# Patient Record
Sex: MALE | URBAN - METROPOLITAN AREA
[De-identification: names, ages, dates, MRNs, and addresses within clinical notes are randomized per-mention and may not be internally consistent; named-entity substitution may affect disease eponyms.]

---

## 2021-06-04 ENCOUNTER — HOSPITAL ENCOUNTER (OUTPATIENT)
Dept: MEDSURG UNIT | Facility: HOSPITAL | Age: 61
End: 2021-06-05
Attending: INTERNAL MEDICINE | Admitting: INTERNAL MEDICINE

## 2021-06-04 LAB
ABS NEUT (OLG): 2.94 X10(3)/MCL (ref 2.1–9.2)
ALBUMIN SERPL-MCNC: 4.3 GM/DL (ref 3.5–5)
ALBUMIN/GLOB SERPL: 1.4 RATIO (ref 1.1–2)
ALP SERPL-CCNC: 71 UNIT/L (ref 40–150)
ALT SERPL-CCNC: 31 UNIT/L (ref 0–55)
AST SERPL-CCNC: 19 UNIT/L (ref 5–34)
BASOPHILS # BLD AUTO: 0.1 X10(3)/MCL (ref 0–0.2)
BASOPHILS NFR BLD AUTO: 1 %
BILIRUB SERPL-MCNC: 0.6 MG/DL
BILIRUBIN DIRECT+TOT PNL SERPL-MCNC: 0.2 MG/DL (ref 0–0.5)
BILIRUBIN DIRECT+TOT PNL SERPL-MCNC: 0.4 MG/DL (ref 0–0.8)
BNP BLD-MCNC: <10 PG/ML
BUN SERPL-MCNC: 21.6 MG/DL (ref 8.4–25.7)
CALCIUM SERPL-MCNC: 9.8 MG/DL (ref 8.4–10.2)
CHLORIDE SERPL-SCNC: 104 MMOL/L (ref 98–107)
CO2 SERPL-SCNC: 26 MMOL/L (ref 22–29)
CREAT SERPL-MCNC: 1.02 MG/DL (ref 0.73–1.18)
D DIMER PPP IA.FEU-MCNC: <0.27 MCG/ML FEU (ref 0–0.5)
EOSINOPHIL # BLD AUTO: 0.2 X10(3)/MCL (ref 0–0.9)
EOSINOPHIL NFR BLD AUTO: 4 %
ERYTHROCYTE [DISTWIDTH] IN BLOOD BY AUTOMATED COUNT: 12.9 % (ref 11.5–17)
GLOBULIN SER-MCNC: 3 GM/DL (ref 2.4–3.5)
GLUCOSE SERPL-MCNC: 114 MG/DL (ref 74–100)
HCT VFR BLD AUTO: 41.2 % (ref 42–52)
HGB BLD-MCNC: 14 GM/DL (ref 14–18)
LYMPHOCYTES # BLD AUTO: 2.1 X10(3)/MCL (ref 0.6–4.6)
LYMPHOCYTES NFR BLD AUTO: 35 %
MCH RBC QN AUTO: 31.1 PG (ref 27–31)
MCHC RBC AUTO-ENTMCNC: 34 GM/DL (ref 33–36)
MCV RBC AUTO: 91.6 FL (ref 80–94)
MONOCYTES # BLD AUTO: 0.7 X10(3)/MCL (ref 0.1–1.3)
MONOCYTES NFR BLD AUTO: 12 %
NEUTROPHILS # BLD AUTO: 2.94 X10(3)/MCL (ref 2.1–9.2)
NEUTROPHILS NFR BLD AUTO: 48 %
PLATELET # BLD AUTO: 125 X10(3)/MCL (ref 130–400)
PMV BLD AUTO: 10.3 FL (ref 9.4–12.4)
POC TROPONIN: 0 NG/ML (ref 0–0.08)
POTASSIUM SERPL-SCNC: 5.1 MMOL/L (ref 3.5–5.1)
PROT SERPL-MCNC: 7.3 GM/DL (ref 6.4–8.3)
RBC # BLD AUTO: 4.5 X10(6)/MCL (ref 4.7–6.1)
SARS-COV-2 AG RESP QL IA.RAPID: NEGATIVE
SODIUM SERPL-SCNC: 143 MMOL/L (ref 136–145)
TROPONIN I SERPL-MCNC: <0.01 NG/ML (ref 0–0.04)
WBC # SPEC AUTO: 6.1 X10(3)/MCL (ref 4.5–11.5)

## 2021-06-05 LAB
TROPONIN I SERPL-MCNC: <0.01 NG/ML (ref 0–0.04)
TROPONIN I SERPL-MCNC: <0.01 NG/ML (ref 0–0.04)

## 2022-04-30 NOTE — ED PROVIDER NOTES
Patient:   Gen Chavez            MRN: [id number]            FIN: [id number]               Age:   61 years     Sex:  Male     :  1960   Associated Diagnoses:   Chest pain; Dyspnea   Author:   Bethel Prado      Basic Information   Time seen: Date & time 2021 18:32:00.   History source: Patient.   Arrival mode: Private vehicle.   Additional information: Patient's physician(s): 1069: Assumed care KAUSHIK CASILLAS.      History of Present Illness   The patient presents with   61 year old male presents to ED for worsening shortness of breath since yesterday with mild chest pain; denies fever - HEATHER Allen PA-C sort .  The onset was 1  days ago.  The course/duration of symptoms is fluctuating in intensity.  Degree at onset moderate.  Degree at present moderate.  The Exacerbating factors is none.  The Relieving factors is none.  Risk factors consist of diabetes mellitus and hypertension.  Prior episodes: none.  Therapy today: none.  Associated symptoms: chest pain.        Review of Systems   Constitutional symptoms:  No fever, no chills.    Respiratory symptoms:  Shortness of breath, No cough,    Cardiovascular symptoms:  Chest pain, No palpitations,    Gastrointestinal symptoms:  No vomiting, no diarrhea.    Musculoskeletal symptoms:  No back pain,    Neurologic symptoms:  No altered level of consciousness, no weakness.              Additional review of systems information: All other systems reviewed and otherwise negative.      Health Status   Allergies:    Allergic Reactions (Selected)  No Known Medication Allergies,    Allergies (1) Active Reaction  No Known Medication Allergies None Documented  .   Medications:  (Selected)   Prescriptions  Prescribed  diclofenac potassium 50 mg oral tablet: 50 mg = 1 tab(s), Oral, TID, PRN PRN as needed for pain, # 30 tab(s), 0 Refill(s), Pharmacy: [facility] #[id number], 165, cm, Height/Length Dosing, 20 15:59:00 CDT, 95, kg, Weight  Dosing, 06/07/20 15:59:00 CDT  tiZANidine 4 mg oral tablet: 4 mg = 1 tab(s), Oral, q8hr, # 21 tab(s), 0 Refill(s), [id number], 165, cm, Height/Length Dosing, 06/07/20 15:59:00 CDT, 95, kg, Weight Dosing, 06/07/20 15:59:00 CDT, per nurse's notes.   Immunizations: Per nurse's notes.      Past Medical/ Family/ Social History   Medical history:    Resolved  Diabetes mellitus ([id number]):  Resolved..   Surgical history:    No active procedure history items have been selected or recorded..   Family history:    No family history items have been selected or recorded..   Social history:    Social & Psychosocial Habits    Tobacco  06/07/2020  Use: Former smoker, quit more    Patient Wants Consult For Cessation Counseling No    Abuse/Neglect  06/07/2020  SHX Any signs of abuse or neglect No  .   Problem list:    No qualifying data available  .      Physical Examination               Vital Signs      No qualifying data available.      Medical Decision Making   Documents reviewed:  Emergency department nurses' notes.   Orders  Launch Orders   Laboratory:  BNP (Order): Stat collect, 6/4/2021 18:32 CDT, Blood, Lab Collect, Print Label By Order Location, 6/4/2021 18:32 CDT  CBC w/ Auto Diff (Order): Now collect, 6/4/2021 18:32 CDT, Blood, Lab Collect, Print Label By Order Location, 6/4/2021 18:32 CDT  CMP (Order): Stat collect, 6/4/2021 18:32 CDT, Blood, Lab Collect, Print Label By Order Location, 6/4/2021 18:32 CDT  Troponin-I (Order): Stat collect, 6/4/2021 18:32 CDT, Blood, Lab Collect, Print Label By Order Location, 6/4/2021 18:32 CDT  POC iSTAT ER Troponin request (Order): Blood, Stat collect, 6/4/2021 18:32 CDT by Mahesh Allen PA-C Lab Collect, Print Label By Order Location  Radiology:  XR Chest 2 Views (Order): Stat, 6/4/2021 18:32 CDT, Dyspnea, None, Stretcher, Rad Type, Not Scheduled  Cardiology:  EKG (Order): 6/4/2021 18:32 CDT, Stat, Once, 6/4/2021 18:32 CDT, -1, -1, 6/4/2021 18:32 CDT,  Launch Orders   Pharmacy:  nitroglycerin 0.4 mg sublingual TAB (Order): 0.4 mg, form: Tab-SL, SL, Once, first dose 6/4/2021 22:06 CDT, stop date 6/4/2021 22:06 CDT, STAT  aspirin 325 mg oral tablet (Order): 325 mg, form: Tab, Oral, Daily, first dose 6/4/2021 22:06 CDT, STAT, 4 chew tab = 5 grains, Launch Orders   Laboratory:  D-Dimer (Order): Stat collect, 6/4/2021 22:09 CDT, Blood, Lab Collect, Print Label By Order Location, 6/4/2021 22:09 CDT, Launch Orders   Laboratory:  COVID-19 IDnow (Order): Now collect, Nasal, 6/4/2021 23:49 CDT, Nurse collect.    Electrocardiogram:  Time 6/4/2021 18:34:00, rate 87, normal sinus rhythm, No ST-T changes, no ectopy, normal NJ & QRS intervals, EP Interp.    Results review:  Lab results : Lab View   6/4/2021 19:25 CDT       POC Troponin              0.00 ng/mL    6/4/2021 19:02 CDT       Sodium Lvl                143 mmol/L                             Potassium Lvl             5.1 mmol/L                             Chloride                  104 mmol/L                             CO2                       26 mmol/L                             Calcium Lvl               9.8 mg/dL                             Glucose Lvl               114 mg/dL  HI                             BUN                       21.6 mg/dL                             Creatinine                1.02 mg/dL                             eGFR-AA                   >60  NA                             eGFR-NAZARIO                  >60 mL/min/1.73 m2  NA                             Bili Total                0.6 mg/dL                             Bili Direct               0.2 mg/dL                             Bili Indirect             0.40 mg/dL                             AST                       19 unit/L                             ALT                       31 unit/L                             Alk Phos                  71 unit/L                             Total Protein             7.3 gm/dL                              Albumin Lvl               4.3 gm/dL                             Globulin                  3.0 gm/dL                             A/G Ratio                 1.4 ratio                             BNP                       <10.0 pg/mL                             Troponin-I                <0.010 ng/mL                             WBC                       6.1 x10(3)/mcL                             RBC                       4.50 x10(6)/mcL  LOW                             Hgb                       14.0 gm/dL                             Hct                       41.2 %  LOW                             Platelet                  125 x10(3)/mcL  LOW                             MCV                       91.6 fL                             MCH                       31.1 pg  HI                             MCHC                      34.0 gm/dL                             RDW                       12.9 %                             MPV                       10.3 fL                             Abs Neut                  2.94 x10(3)/mcL                             Neutro Auto               48 %  NA                             Lymph Auto                35 %  NA                             Mono Auto                 12 %  NA                             Eos Auto                  4 %  NA                             Abs Eos                   0.2 x10(3)/mcL                             Basophil Auto             1 %  NA                             Abs Neutro                2.94 x10(3)/mcL                             Abs Lymph                 2.1 x10(3)/mcL                             Abs Mono                  0.7 x10(3)/mcL                             Abs Baso                  0.1 x10(3)/mcL  ,    No qualifying data available.    Radiology results:  Rad Results (ST)  < 12 hrs   Accession: [id number]  Order: XR Chest 2 Views  Report Dt/Tm: 06/04/2021 18:52  Report:   CHEST 2 VIEW RADIOGRAPHIC SERIES     INDICATION: Dyspnea    FINDINGS:  Lungs  are well aerated. No sizable pleural effusion. No shift of the  mediastinum.     IMPRESSION:  No active cardiopulmonary finding    .      Impression and Plan   Diagnosis   Chest pain (IGJ81-AJ R07.9)   Dyspnea (YFV79-JB R06.00)      Calls-Consults   -  6/4/2021 23:55:00 , Robe Dupree MD, recommends consuelo accepts.    Plan   Condition: Stable.    Disposition: Admit time  6/5/2021 00:51:00, Place in Observation Unit.    Counseled: Patient, Regarding diagnosis, Regarding diagnostic results, Regarding treatment plan, Patient indicated understanding of instructions.    Orders: Launch Orders   Admit/Transfer/Discharge:  Place in Outpatient Observation (Order): 6/5/2021 0:52 CDT, Telemetry with Monitor Robe Dupree MD, No.    Notes: Admitted in collaboration with Dr. MCKENZIE Hector.

## 2022-04-30 NOTE — ED PROVIDER NOTES
Patient:   Gen Chavez            MRN: [id number]            FIN: [id number]               Age:   61 years     Sex:  Male     :  1960   Associated Diagnoses:   Acute chest pain   Author:   Crow Hector MD      History of Present Illness   The patient presents with I examined this patient in conjunction with nurse practitioner, I agree with his assessment and plan.  Patient is a 61-year-old male presenting with complaint of episodes of left sided chest pain over the past couple of days.  Patient states episodes of chest pain usually start while he is working.  He denies any chest pain currently.  Patient states that the chest pain did last a minute or 2 at a time.  Patient denies nausea vomiting.  No abdominal pain.  Patient is not a smoker but does have a history of diabetes. I examined this patient in conjunction with nurse practitioner, I agree with his assessment and plan.  Patient is a 61-year-old male presenting with complaint of episodes of left sided chest pain over the past couple of days.  Patient states episodes of chest pain usually start while he is working.  He denies any chest pain currently.  Patient states that the chest pain did last a minute or 2 at a time.  Patient denies nausea vomiting cough or shortness of breath.  No abdominal pain.  Patient is not a smoker but does have a history of diabetes. .  Risk factors consist of hypertension, diabetes mellitus, obesity and age.  Associated symptoms: chest pain, shortness of breath denies shortness of breath , denies abdominal pain, denies nausea, denies vomiting, denies fever, denies chills, denies headache, denies dizziness and denies back pain.        Review of Systems   Constitutional symptoms:  No fever, no chills, no sweats, no weakness, no fatigue, no decreased activity.    Skin symptoms:  No jaundice, no rash, no pruritus, no abrasions, no breakdown, no burns, no dryness, no petechiae, no lesion.    Eye symptoms:  Vision  unchanged, no pain, no blurred vision.    ENMT symptoms:  No ear pain, no sore throat, no nasal congestion, no sinus pain.    Respiratory symptoms:  Shortness of breath No shortness of breath , no orthopnea, no cough, no hemoptysis, no stridor, no wheezing.    Cardiovascular symptoms:  Chest pain, no palpitations, no tachycardia, no syncope, no diaphoresis, no peripheral edema.    Gastrointestinal symptoms:  No abdominal pain, no nausea, no vomiting, no diarrhea, no constipation, no rectal bleeding, no rectal pain.    Genitourinary symptoms:  No dysuria, no hematuria.    Musculoskeletal symptoms:  No back pain, no Muscle pain, no Joint pain.    Neurologic symptoms:  No headache, no dizziness, no altered level of consciousness, no numbness, no tingling, no weakness.              Additional review of systems information: All systems reviewed as documented in chart.      Physical Examination               Vital Signs   Vital Signs   6/5/2021 0:00 CDT        Peripheral Pulse Rate     74 bpm                             Respiratory Rate          18 br/min                             SpO2                      99 %                             Oxygen Therapy            Room air                             Systolic Blood Pressure   129 mmHg                             Diastolic Blood Pressure  79 mmHg                             Mean Arterial Pressure, Cuff              96 mmHg    6/4/2021 23:00 CDT       Peripheral Pulse Rate     79 bpm                             Respiratory Rate          16 br/min                             SpO2                      98 %                             Oxygen Therapy            Room air                             Systolic Blood Pressure   131 mmHg                             Diastolic Blood Pressure  79 mmHg                             Mean Arterial Pressure, Cuff              96 mmHg    6/4/2021 22:00 CDT       Peripheral Pulse Rate     86 bpm                             Heart Rate  Monitored      67 bpm                             Respiratory Rate          16 br/min                             SpO2                      98 %                             Systolic Blood Pressure   117 mmHg                             Diastolic Blood Pressure  76 mmHg                             Mean Arterial Pressure, Cuff              90 mmHg    6/4/2021 21:00 CDT       Peripheral Pulse Rate     79 bpm                             Heart Rate Monitored      78 bpm                             Respiratory Rate          16 br/min                             SpO2                      97 %                             Oxygen Therapy            Room air                             Systolic Blood Pressure   119 mmHg                             Diastolic Blood Pressure  75 mmHg                             Mean Arterial Pressure, Cuff              90 mmHg    6/4/2021 20:34 CDT       Peripheral Pulse Rate     84 bpm                             Heart Rate Monitored      81 bpm                             SpO2                      97 %                             Systolic Blood Pressure   126 mmHg                             Diastolic Blood Pressure  87 mmHg                             Mean Arterial Pressure, Cuff              100 mmHg    6/4/2021 18:31 CDT       Temperature Oral          36.7 DegC                             Temperature Oral (calculated)             98.06 DegF                             Peripheral Pulse Rate     91 bpm                             Respiratory Rate          18 br/min                             SpO2                      99 %                             Oxygen Therapy            Room air                             Systolic Blood Pressure   121 mmHg                             Diastolic Blood Pressure  67 mmHg  .   Oxygen saturation.   General:  Alert, no acute distress.    Skin:  Warm, dry, no rash.    Head:  Normocephalic, atraumatic.    Neck:  Supple, trachea midline, no tenderness.    Eye    Ears, nose, mouth and throat:  Oral mucosa moist.   Respiratory:  Lungs are clear to auscultation, respirations are non-labored, breath sounds are equal, Symmetrical chest wall expansion.    Cardiovascular:  Regular rate and rhythm, No murmur, Normal peripheral perfusion.    Gastrointestinal:  Soft, Nontender, Non distended, Normal bowel sounds, No organomegaly.    Musculoskeletal:  Normal ROM, normal strength, no swelling, no deformity.    Neurological:  Alert and oriented to person, place, time, and situation, No focal neurological deficit observed, normal sensory observed, normal motor observed, normal speech observed.    Psychiatric:  Cooperative, appropriate mood & affect.       Medical Decision Making   Results review:  Lab results : Lab View   6/4/2021 22:09 CDT       D-Dimer                   <0.27 mcg/mL FEU    6/4/2021 19:25 CDT       POC Troponin              0.00 ng/mL    6/4/2021 19:02 CDT       Sodium Lvl                143 mmol/L                             Potassium Lvl             5.1 mmol/L                             Chloride                  104 mmol/L                             CO2                       26 mmol/L                             Calcium Lvl               9.8 mg/dL                             Glucose Lvl               114 mg/dL  HI                             BUN                       21.6 mg/dL                             Creatinine                1.02 mg/dL                             eGFR-AA                   >60  NA                             eGFR-NAZARIO                  >60 mL/min/1.73 m2  NA                             Bili Total                0.6 mg/dL                             Bili Direct               0.2 mg/dL                             Bili Indirect             0.40 mg/dL                             AST                       19 unit/L                             ALT                       31 unit/L                             Alk Phos                  71 unit/L                              Total Protein             7.3 gm/dL                             Albumin Lvl               4.3 gm/dL                             Globulin                  3.0 gm/dL                             A/G Ratio                 1.4 ratio                             BNP                       <10.0 pg/mL                             Troponin-I                <0.010 ng/mL                             WBC                       6.1 x10(3)/mcL                             RBC                       4.50 x10(6)/mcL  LOW                             Hgb                       14.0 gm/dL                             Hct                       41.2 %  LOW                             Platelet                  125 x10(3)/mcL  LOW                             MCV                       91.6 fL                             MCH                       31.1 pg  HI                             MCHC                      34.0 gm/dL                             RDW                       12.9 %                             MPV                       10.3 fL                             Abs Neut                  2.94 x10(3)/mcL                             Neutro Auto               48 %  NA                             Lymph Auto                35 %  NA                             Mono Auto                 12 %  NA                             Eos Auto                  4 %  NA                             Abs Eos                   0.2 x10(3)/mcL                             Basophil Auto             1 %  NA                             Abs Neutro                2.94 x10(3)/mcL                             Abs Lymph                 2.1 x10(3)/mcL                             Abs Mono                  0.7 x10(3)/mcL                             Abs Baso                  0.1 x10(3)/mcL    6/4/2021 0:10 CDT        COVID-19 Rapid            NEGATIVE  .   Radiology results:  Rad Results (ST)  < 12 hrs   Accession: [id number]  Order: XR Chest 2 Views  Report  Dt/Tm: 06/04/2021 18:52  Report:   CHEST 2 VIEW RADIOGRAPHIC SERIES     INDICATION: Dyspnea    FINDINGS:  Lungs are well aerated. No sizable pleural effusion. No shift of the  mediastinum.     IMPRESSION:  No active cardiopulmonary finding    .      Reexamination/ Reevaluation   Time: 6/5/2021 01:04:00 .   Vital signs   results included from flowsheet : Vital Signs   6/5/2021 0:00 CDT        Peripheral Pulse Rate     74 bpm                             Respiratory Rate          18 br/min                             SpO2                      99 %                             Oxygen Therapy            Room air                             Systolic Blood Pressure   129 mmHg                             Diastolic Blood Pressure  79 mmHg                             Mean Arterial Pressure, Cuff              96 mmHg        Impression and Plan   Diagnosis   Acute chest pain ([id number] R07.9)   Plan   Condition: Stable.    Disposition: Admit time  6/5/2021 01:04:00.    Counseled: Patient, Regarding diagnosis, Regarding diagnostic results, Regarding treatment plan, Patient indicated understanding of instructions.

## 2022-05-03 NOTE — HISTORICAL [facility]
This is a historical note converted from [place]. Formatting and pictures may have been removed.  Please reference [place] for original formatting and attached multimedia. Chief Complaint  SOB, chest pain  History of Present Illness  Gen Chavez is a 61-year-old gentleman with a history of?diabetes mellitus,?HLD,?CAD, JOSE not on CPAP and hypertension?who presented to the ED with complaints of a sudden onset, intermittent?left-sided?and lower substernal, nonradiating?chest pain described as a?mild?pressure?and a warm?and wet?sensation?over the area with associated?shortness of breath which he attributes?to hyperventilating?that began x2 days ago while he was sitting down eating at a restaurant. ?He denies any specific alleviating or exacerbating factors.? He apparently?had a?LHC?approximately x20 years ago with cardiology,?and had partial?blockage?but did not require?any stents.? He stated he had a repeat LHC?a year later?after he was started?on a statin?and the cardiologist stated?the blockage improved.? He denies current chest pain,?palpitations, shortness of breath, fever, cough, abdominal pain, N/V/D, or?dysuria. His vital signs are unremarkable. ?Lab results showed a glucose 114 otherwise unremarkable with a negative troponin x3. ?COVID-19 negative. CXR showed no active cardiopulmonary finding. He was given? mg and nitroglycerin while in the ED.  Review of Systems  As per HPI otherwise all systems reviewed and negative.  Physical Exam  Vitals & Measurements  T:?36.7? ?C (Oral)? HR:?84(Peripheral)? RR:?18? BP:?137/80? SpO2:?96%? WT:?100?kg? BMI:?36.73?  General:?well-developed well-nourished in no acute distress  Eye: PERRLA, EOMI, clear conjunctiva, eyelids normal  HENT:? oropharynx without erythema/exudate, oropharynx and nasal mucosal surfaces moist  Neck: full range of motion, no? lymphadenopathy  Respiratory:?clear to auscultation bilaterally  Cardiovascular:?regular rate and rhythm without murmurs,  gallops or rubs, nonreproducible chest wall tenderness  Gastrointestinal:?soft, non-tender, non-distended with normal bowel sounds, without masses to palpation, obese  Genitourinary: no CVA tenderness to palpation  Musculoskeletal:?full range of motion of all extremities/spine without limitation or discomfort  Integumentary: warm, Dry  Neurologic: cranial nerves intact, no signs of peripheral neurological deficit, motor/sensory function intact  ?  Assessment/Plan  Atypical chest pain?possibly indigestion, resolved  Shortness of breath  Mild hyperglycemia secondary to diabetes mellitus  Hypertension  Hyperlipidemia  CAD  ?  Plan:  -His chest pain?and shortness of breath?are currently resolved, chest x-ray is negative, EKG was unremarkable,?and troponin?x3?negative so he most likely can go home?soon  -Continue with daily aspirin, control pain as needed  -D-dimer was negative  -Monitor CBG?and insulin sliding scale?before meals and at bedtime  -Repeat CBC and BMP in a.m.  ?  DVT prophylaxis: SCDs  Code Status: Full  PCP: Dr. Coffman  ?  I, Dylan Siddiqui PA-C, discussed this case with Dr. Green.  Please see addendum for further assessment and plan per MD.  ?  ?   I Dr. Green Assumed care of patient today at 11:00 a.m.  ?   For this patient encounter, I reviewed the NP/PA documentation, treatment plan and medical decision making, and I had face to face time with this patient.?  ?   Seen and examined the patient.  He is a 61 years old male with a history of?hypertension diabetes hyperlipidemia.  He is coming with atypical chest pain. 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 ?He states that chest pain?is not related with any type of activity it is only 10 seconds and goes away?he?denies any radiation of the pain. ?He sometimes?feel?the same?pain?after the meals.??He had a cardiac cath 20 years ago which was unremarkable. ?He also had a?recent checkup, including  stress test?in December 2020?which was?also normal. ?He denies any pain or shortness of breath?with?activity?altogether.  He is afebrile vitals stable hemodynamically stable.? Labs were completely unremarkable EKG is normal sinus rhythm?troponins x3.  ?   Agree with above exam.  ?   I discussed extensively with the patient in regards of his symptoms and the nature of the symptoms. ?All the tests are negative in the hospital?including troponins EKG and chest x-ray.??Giving the recent outdoor activities and physical work?he has done with the upper?body, his pain could be?musculoskeletal. ?He is also describing?some discomfort after?a meal.  I will discharge patient to home with a close follow-up with cardiology. ?He could get?outpatient stress test.discussed with these options with him?extensively and he agreed.  I will give him a follow-up with cardiologist, and primary care physician?and discharged in a stable condition to home.   Problem List/Past Medical History  DM type II  Hypertension  Hyperlipidemia  JOSE  CAD  Procedure/Surgical History  Carpal tunnel  Left arthroscopic knee surgery  Deviated septum surgery  Medications  Inpatient  aspirin 325 mg oral tablet, 325 mg= 1 tab(s), Oral, Daily  Dextrose 50% and Water (50 mL vial/syringe), 12.5 gm= 25 mL, IV Push, Once, PRN  Dextrose 50% and Water (50 mL vial/syringe), 12.5 gm= 25 mL, IV Push, As Directed, PRN  Dextrose 50% and Water (50 mL vial/syringe), 25 gm= 50 mL, IV Push, As Directed, PRN  Dextrose 50% in Water intravenous solution, 12.5 gm= 25 mL, IV Push, As Directed, PRN  glucagon, 1 mg= 1 EA, IM, q10min, PRN  glucagon, 1 mg= 1 EA, IM, q10min, PRN  insulin lispro, 2-14 units, Subcutaneous, As Directed, PRN  Home  atorvastatin 40 mg oral tablet, 40 mg= 1 tab(s), Oral, qPM  diclofenac potassium 50 mg oral tablet, 50 mg= 1 tab(s), Oral, TID, PRN,? ?Not Taking, Completed Rx  glimepiride 4 mg oral tablet, 4 mg= 1 tab(s), Oral, BID  lisinopril 20 mg oral tablet,  20 mg= 1 tab(s), Oral, Daily  metFORMIN 1000 mg oral tablet, 1000 mg= 1 tab(s), Oral, BID  Ozempic (1 mg dose) 2 mg/1.5 mL subcutaneous solution, 1 mg= 1 mg, Subcutaneous, qWeek  pioglitazone 30 mg oral tablet, 30 mg= 1 tab(s), Oral, Daily,? ?Not taking  tiZANidine 4 mg oral tablet, 4 mg= 1 tab(s), Oral, q8hr,? ?Not Taking, Completed Rx  Vascepa 1 g oral capsule, 2 gm= 2 cap(s), Oral, BID  Allergies  No Known Medication Allergies  Social History  Abuse/Neglect  No, 06/05/2021  Tobacco  Former smoker, quit more than 30 days ago, No, 06/05/2021  Alcohol- occasional  Illicit Drugs- none  Family History  Mother: Afib  Father: Afib, stroke  Lab Results  Labs Last 24 Hours?  ?Chemistry? Hematology/Coagulation?   Sodium Lvl: 143 mmol/L (06/04/21 19:02:00) D-Dimer: <0.27 (06/04/21 22:09:00)   Potassium Lvl: 5.1 mmol/L (06/04/21 19:02:00) WBC: 6.1 x10(3)/mcL (06/04/21 19:02:00)   Chloride: 104 mmol/L (06/04/21 19:02:00) RBC:?4.5 x10(6)/mcL?Low (06/04/21 19:02:00)   CO2: 26 mmol/L (06/04/21 19:02:00) Hgb: 14 gm/dL (06/04/21 19:02:00)   Calcium Lvl: 9.8 mg/dL (06/04/21 19:02:00) Hct:?41.2 %?Low (06/04/21 19:02:00)   Glucose Lvl:?114 mg/dL?High (06/04/21 19:02:00) Platelet:?125 x10(3)/mcL?Low (06/04/21 19:02:00)   BUN: 21.6 mg/dL (06/04/21 19:02:00) MCV: 91.6 fL (06/04/21 19:02:00)   Creatinine: 1.02 mg/dL (06/04/21 19:02:00) MCH:?31.1 pg?High (06/04/21 19:02:00)   Est Creat Clearance Ser: 67.73 mL/min (06/05/21 01:36:46) MCHC: 34 gm/dL (06/04/21 19:02:00)   eGFR-AA: >60 (06/04/21 19:02:00) RDW: 12.9 % (06/04/21 19:02:00)   eGFR-NAZARIO: >60 (06/04/21 19:02:00) MPV: 10.3 fL (06/04/21 19:02:00)   Bili Total: 0.6 mg/dL (06/04/21 19:02:00) Abs Neut: 2.94 x10(3)/mcL (06/04/21 19:02:00)   Bili Direct: 0.2 mg/dL (06/04/21 19:02:00) Neutro Auto: 48 % (06/04/21 19:02:00)   Bili Indirect: 0.4 mg/dL (06/04/21 19:02:00) Lymph Auto: 35 % (06/04/21 19:02:00)   AST: 19 unit/L (06/04/21 19:02:00) Mono Auto: 12 % (06/04/21 19:02:00)   ALT: 31  unit/L (06/04/21 19:02:00) Eos Auto: 4 % (06/04/21 19:02:00)   Alk Phos: 71 unit/L (06/04/21 19:02:00) Abs Eos: 0.2 x10(3)/mcL (06/04/21 19:02:00)   Total Protein: 7.3 gm/dL (06/04/21 19:02:00) Basophil Auto: 1 % (06/04/21 19:02:00)   Albumin Lvl: 4.3 gm/dL (06/04/21 19:02:00) Abs Neutro: 2.94 x10(3)/mcL (06/04/21 19:02:00)   Globulin: 3 gm/dL (06/04/21 19:02:00) Abs Lymph: 2.1 x10(3)/mcL (06/04/21 19:02:00)   A/G Ratio: 1.4 ratio (06/04/21 19:02:00) Abs Mono: 0.7 x10(3)/mcL (06/04/21 19:02:00)   BNP: <10.0 (06/04/21 19:02:00) Abs Baso: 0.1 x10(3)/mcL (06/04/21 19:02:00)   Troponin-I: <0.010 (06/05/21 08:23:00)    POC Troponin: 0 ng/mL (06/04/21 19:25:00)    Diagnostic Results  ?  Reason For Exam  Dyspnea  ?  Radiology Report  CHEST 2 VIEW RADIOGRAPHIC SERIES  ?  INDICATION: Dyspnea ?  FINDINGS:  Lungs are well aerated. No sizable pleural effusion. No shift of the  mediastinum.  ?  IMPRESSION:  No active cardiopulmonary finding  ?  Signature Line  Electronically Signed By: Diane Knight MD  Date/Time Signed: 06/04/2021 18:53  ?      He was advised to come back to the hospital?if?his chest pain persists?and worsens, or he has?shortness of breath.

## 2023-06-05 ENCOUNTER — LAB REQUISITION (OUTPATIENT)
Dept: LAB | Facility: HOSPITAL | Age: 63
End: 2023-06-05
Payer: COMMERCIAL

## 2023-06-05 DIAGNOSIS — H66.3X1 OTHER CHRONIC SUPPURATIVE OTITIS MEDIA, RIGHT EAR: ICD-10-CM

## 2023-06-05 PROCEDURE — 87075 CULTR BACTERIA EXCEPT BLOOD: CPT | Performed by: OTOLARYNGOLOGY

## 2023-06-05 PROCEDURE — 87070 CULTURE OTHR SPECIMN AEROBIC: CPT | Performed by: OTOLARYNGOLOGY

## 2023-06-05 PROCEDURE — 87102 FUNGUS ISOLATION CULTURE: CPT | Performed by: OTOLARYNGOLOGY

## 2023-06-07 LAB — BACTERIA DEEP WND CULT: ABNORMAL

## 2023-06-08 LAB — BACTERIA SPEC ANAEROBE CULT: NORMAL

## 2023-07-10 LAB — FUNGUS SPEC CULT: NORMAL

## 2024-12-08 ENCOUNTER — HOSPITAL ENCOUNTER (EMERGENCY)
Facility: HOSPITAL | Age: 64
Discharge: HOME | End: 2024-12-08
Attending: EMERGENCY MEDICINE
Payer: COMMERCIAL

## 2024-12-08 VITALS
HEIGHT: 65 IN | SYSTOLIC BLOOD PRESSURE: 128 MMHG | RESPIRATION RATE: 18 BRPM | DIASTOLIC BLOOD PRESSURE: 80 MMHG | OXYGEN SATURATION: 99 % | WEIGHT: 180 LBS | TEMPERATURE: 98 F | HEART RATE: 78 BPM | BODY MASS INDEX: 29.99 KG/M2

## 2024-12-08 DIAGNOSIS — M65.20: Primary | ICD-10-CM

## 2024-12-08 DIAGNOSIS — M79.642 LEFT HAND PAIN: ICD-10-CM

## 2024-12-08 DIAGNOSIS — S66.919A OVERUSE SYNDROME OF HAND, INITIAL ENCOUNTER: ICD-10-CM

## 2024-12-08 LAB
ALBUMIN SERPL-MCNC: 3.9 G/DL (ref 3.4–4.8)
ALBUMIN/GLOB SERPL: 1.3 RATIO (ref 1.1–2)
ALP SERPL-CCNC: 48 UNIT/L (ref 40–150)
ALT SERPL-CCNC: 26 UNIT/L (ref 0–55)
ANION GAP SERPL CALC-SCNC: 6 MEQ/L
AST SERPL-CCNC: 18 UNIT/L (ref 5–34)
BASOPHILS # BLD AUTO: 0.07 X10(3)/MCL
BASOPHILS NFR BLD AUTO: 0.6 %
BILIRUB SERPL-MCNC: 0.4 MG/DL
BUN SERPL-MCNC: 16.3 MG/DL (ref 8.4–25.7)
CALCIUM SERPL-MCNC: 9 MG/DL (ref 8.8–10)
CHLORIDE SERPL-SCNC: 106 MMOL/L (ref 98–107)
CO2 SERPL-SCNC: 25 MMOL/L (ref 23–31)
CREAT SERPL-MCNC: 1.02 MG/DL (ref 0.72–1.25)
CREAT/UREA NIT SERPL: 16
EOSINOPHIL # BLD AUTO: 0.44 X10(3)/MCL (ref 0–0.9)
EOSINOPHIL NFR BLD AUTO: 3.9 %
ERYTHROCYTE [DISTWIDTH] IN BLOOD BY AUTOMATED COUNT: 12.8 % (ref 11.5–17)
GFR SERPLBLD CREATININE-BSD FMLA CKD-EPI: >60 ML/MIN/1.73/M2
GLOBULIN SER-MCNC: 3 GM/DL (ref 2.4–3.5)
GLUCOSE SERPL-MCNC: 127 MG/DL (ref 82–115)
HCT VFR BLD AUTO: 41.5 % (ref 42–52)
HGB BLD-MCNC: 14.1 G/DL (ref 14–18)
IMM GRANULOCYTES # BLD AUTO: 0.04 X10(3)/MCL (ref 0–0.04)
IMM GRANULOCYTES NFR BLD AUTO: 0.4 %
LYMPHOCYTES # BLD AUTO: 1.66 X10(3)/MCL (ref 0.6–4.6)
LYMPHOCYTES NFR BLD AUTO: 14.7 %
MCH RBC QN AUTO: 31.5 PG (ref 27–31)
MCHC RBC AUTO-ENTMCNC: 34 G/DL (ref 33–36)
MCV RBC AUTO: 92.8 FL (ref 80–94)
MONOCYTES # BLD AUTO: 1.02 X10(3)/MCL (ref 0.1–1.3)
MONOCYTES NFR BLD AUTO: 9 %
NEUTROPHILS # BLD AUTO: 8.08 X10(3)/MCL (ref 2.1–9.2)
NEUTROPHILS NFR BLD AUTO: 71.4 %
NRBC BLD AUTO-RTO: 0 %
PLATELET # BLD AUTO: 136 X10(3)/MCL (ref 130–400)
PMV BLD AUTO: 10.5 FL (ref 7.4–10.4)
POTASSIUM SERPL-SCNC: 4.6 MMOL/L (ref 3.5–5.1)
PROT SERPL-MCNC: 6.9 GM/DL (ref 5.8–7.6)
RBC # BLD AUTO: 4.47 X10(6)/MCL (ref 4.7–6.1)
SODIUM SERPL-SCNC: 137 MMOL/L (ref 136–145)
WBC # BLD AUTO: 11.31 X10(3)/MCL (ref 4.5–11.5)

## 2024-12-08 PROCEDURE — 80053 COMPREHEN METABOLIC PANEL: CPT

## 2024-12-08 PROCEDURE — 85025 COMPLETE CBC W/AUTO DIFF WBC: CPT

## 2024-12-08 PROCEDURE — 96372 THER/PROPH/DIAG INJ SC/IM: CPT

## 2024-12-08 PROCEDURE — 63600175 PHARM REV CODE 636 W HCPCS

## 2024-12-08 PROCEDURE — 99284 EMERGENCY DEPT VISIT MOD MDM: CPT | Mod: 25

## 2024-12-08 RX ORDER — DICLOFENAC SODIUM 10 MG/G
2 GEL TOPICAL 4 TIMES DAILY
Qty: 200 G | Refills: 0 | Status: SHIPPED | OUTPATIENT
Start: 2024-12-08

## 2024-12-08 RX ORDER — KETOROLAC TROMETHAMINE 30 MG/ML
30 INJECTION, SOLUTION INTRAMUSCULAR; INTRAVENOUS
Status: COMPLETED | OUTPATIENT
Start: 2024-12-08 | End: 2024-12-08

## 2024-12-08 RX ORDER — KETOROLAC TROMETHAMINE 10 MG/1
10 TABLET, FILM COATED ORAL EVERY 6 HOURS
Qty: 20 TABLET | Refills: 0 | Status: SHIPPED | OUTPATIENT
Start: 2024-12-08 | End: 2024-12-13

## 2024-12-08 RX ADMIN — KETOROLAC TROMETHAMINE 30 MG: 30 INJECTION, SOLUTION INTRAMUSCULAR at 06:12

## 2024-12-08 NOTE — ED PROVIDER NOTES
Encounter Date: 12/8/2024       History     Chief Complaint   Patient presents with    Hand Pain     C/o left hand pain he states he is an  and using a hammer a few days ago and notices some pain that has gotten worsen this AM hx of prior injury to the hand had and has decreased movement at baseline       The history is provided by the patient.     63y/o male with PMH DMII presents to ED for 2nd  left finger pain Onset 3 days ago. Associated symptoms include swelling. Denies trauma, recent injury, erythema, fever. Has taken tylenol for the pain. Works as  and has become progressively difficult and painful to  tools. Reports hx of fracture to affected finger in 1990s.     Review of patient's allergies indicates:  No Known Allergies  Past Medical History:   Diagnosis Date    Diabetes mellitus     Hypertension      No past surgical history on file.  No family history on file.  Social History     Tobacco Use    Smoking status: Never    Smokeless tobacco: Never     Review of Systems   Constitutional:  Negative for activity change and fever.   HENT:  Negative for congestion.    Respiratory:  Negative for shortness of breath.    Cardiovascular:  Negative for chest pain.   Gastrointestinal:  Negative for abdominal pain.   Genitourinary:  Negative for difficulty urinating.   Musculoskeletal:  Positive for joint swelling.   Skin:  Negative for rash.       Physical Exam     Initial Vitals [12/08/24 0433]   BP Pulse Resp Temp SpO2   136/75 87 18 97.9 °F (36.6 °C) 99 %      MAP       --         Physical Exam    Nursing note and vitals reviewed.  Constitutional: He appears well-developed.   Eyes: EOM are normal.   Cardiovascular:  Normal rate.           Pulmonary/Chest: No respiratory distress. He has no wheezes.   Abdominal: He exhibits no distension.   Musculoskeletal:      Comments: Tenderness to palpation of 2nd left MCP joint without overlying erythema or breaks in skin. ROM limited by pain. Diffuse  swelling of left hand.      Neurological: He is alert and oriented to person, place, and time. No cranial nerve deficit. GCS score is 15. GCS eye subscore is 4. GCS verbal subscore is 5. GCS motor subscore is 6.   Skin: Skin is warm and dry. Capillary refill takes less than 2 seconds. No rash noted. No erythema.   Psychiatric: He has a normal mood and affect. His behavior is normal. Thought content normal.         ED Course   Procedures  Labs Reviewed   COMPREHENSIVE METABOLIC PANEL - Abnormal       Result Value    Sodium 137      Potassium 4.6      Chloride 106      CO2 25      Glucose 127 (*)     Blood Urea Nitrogen 16.3      Creatinine 1.02      Calcium 9.0      Protein Total 6.9      Albumin 3.9      Globulin 3.0      Albumin/Globulin Ratio 1.3      Bilirubin Total 0.4      ALP 48      ALT 26      AST 18      eGFR >60      Anion Gap 6.0      BUN/Creatinine Ratio 16     CBC WITH DIFFERENTIAL - Abnormal    WBC 11.31      RBC 4.47 (*)     Hgb 14.1      Hct 41.5 (*)     MCV 92.8      MCH 31.5 (*)     MCHC 34.0      RDW 12.8      Platelet 136      MPV 10.5 (*)     Neut % 71.4      Lymph % 14.7      Mono % 9.0      Eos % 3.9      Basophil % 0.6      Lymph # 1.66      Neut # 8.08      Mono # 1.02      Eos # 0.44      Baso # 0.07      IG# 0.04      IG% 0.4      NRBC% 0.0     CBC W/ AUTO DIFFERENTIAL    Narrative:     The following orders were created for panel order CBC Auto Differential.  Procedure                               Abnormality         Status                     ---------                               -----------         ------                     CBC with Differential[[id number]]        Abnormal            Final result                 Please view results for these tests on the individual orders.          Imaging Results              X-Ray Hand 3 view Left (In process)                      Medications   ketorolac injection 30 mg (30 mg Intramuscular Given 12/8/24 0619)     Medical Decision Making  Upon  re-evaluation, Gen Chavez is resting comfortably in no acute distress. CMP and CBC without renal dysfunction or leukocytosis. Hand x ray reveals tendon calcification of affect digit. He received ketorlac injection and reports some improvement. Assessment is calcified tendinosis of 2nd left finger with overuse pain. Referral to ortho sent. Discussed with patient the condition, test results, and the treatment plan. Detailed written and verbal instructions provided to patient and he expressed a verbal understanding of the discharge instructions. He is without objective evidence for acute process requiring immediate intervention or hospitalization. ED return precautions discussed including shortness of breath, difficulty breathing, chest pain, worsening appearance, changes in color, worsening swelling. Reiterated the importance of medication adherence and importance of follow up with primary care provider within 1 week. Patient voices understanding, agrees to the plan discussed. Opportunity to ask questions and all questions answered. They have remained hemodynamically stable throughout entire stay in ED and is stable for discharge home.        Amount and/or Complexity of Data Reviewed  Labs: ordered.    Risk  Prescription drug management.                                      Clinical Impression:  Final diagnoses:  [M79.642] Left hand pain  [S66.919A] Overuse syndrome of hand, initial encounter  [M65.20] Tendinitis, calcific (Primary)          ED Disposition Condition    Discharge Stable          ED Prescriptions       Medication Sig Dispense Start Date End Date Auth. Provider    ketorolac (TORADOL) 10 mg tablet Take 1 tablet (10 mg total) by mouth every 6 (six) hours. for 5 days 20 tablet 12/8/2024 12/13/2024 Regina Puente MD    diclofenac sodium (VOLTAREN ARTHRITIS PAIN) 1 % Gel Apply 2 g topically 4 (four) times daily. 200 g 12/8/2024 -- Regina Puente MD          Follow-up Information       Follow up With  Specialties Details Why Contact Info    Charlie Coffman MD Family Medicine In 1 week  [address] [facility]   [address]  [place] [address]  [phone]      [facility] - Emergency Dept Emergency Medicine  If symptoms worsen [address] [address] [facility] [id number] [phone]    Pedro Child MD Orthopedic Surgery  Someone will contact you to schedule appointment [address]  [address]  [place] [address]  [phone]               Regina Puente MD  Resident  12/08/24 0980

## 2025-01-13 ENCOUNTER — OFFICE VISIT (OUTPATIENT)
Dept: ORTHOPEDICS | Facility: CLINIC | Age: 65
End: 2025-01-13
Payer: COMMERCIAL

## 2025-01-13 VITALS — WEIGHT: 180 LBS | HEIGHT: 65 IN | BODY MASS INDEX: 29.99 KG/M2

## 2025-01-13 DIAGNOSIS — M65.20: ICD-10-CM

## 2025-01-13 DIAGNOSIS — M20.092 CONTRACTURE OF LEFT INDEX FINGER: Primary | ICD-10-CM

## 2025-01-13 PROCEDURE — 3008F BODY MASS INDEX DOCD: CPT | Mod: CPTII,,, | Performed by: ORTHOPAEDIC SURGERY

## 2025-01-13 PROCEDURE — 1159F MED LIST DOCD IN RCRD: CPT | Mod: CPTII,,, | Performed by: ORTHOPAEDIC SURGERY

## 2025-01-13 PROCEDURE — 99203 OFFICE O/P NEW LOW 30 MIN: CPT | Mod: ,,, | Performed by: ORTHOPAEDIC SURGERY

## 2025-01-13 PROCEDURE — 1160F RVW MEDS BY RX/DR IN RCRD: CPT | Mod: CPTII,,, | Performed by: ORTHOPAEDIC SURGERY

## 2025-01-13 RX ORDER — CALCIUM CITRATE/VITAMIN D3 200MG-6.25
TABLET ORAL
COMMUNITY
Start: 2024-12-17

## 2025-01-13 RX ORDER — METFORMIN HYDROCHLORIDE 500 MG/1
TABLET ORAL
COMMUNITY
Start: 2024-08-20

## 2025-01-13 RX ORDER — GLIMEPIRIDE 4 MG/1
4 TABLET ORAL
COMMUNITY
Start: 2021-06-04

## 2025-01-13 RX ORDER — TIRZEPATIDE 15 MG/.5ML
INJECTION, SOLUTION SUBCUTANEOUS
COMMUNITY
Start: 2024-08-20

## 2025-01-13 RX ORDER — ATORVASTATIN CALCIUM 40 MG/1
40 TABLET, FILM COATED ORAL
COMMUNITY
Start: 2021-06-04

## 2025-01-13 RX ORDER — ASPIRIN 81 MG/1
81 TABLET ORAL
COMMUNITY
Start: 2021-06-05

## 2025-01-13 RX ORDER — SEMAGLUTIDE 1.34 MG/ML
1 INJECTION, SOLUTION SUBCUTANEOUS
COMMUNITY
Start: 2021-06-04

## 2025-01-13 RX ORDER — ICOSAPENT ETHYL 1 G/1
2 CAPSULE ORAL 2 TIMES DAILY
COMMUNITY
Start: 2021-06-04

## 2025-01-13 RX ORDER — MONTELUKAST SODIUM 10 MG/1
1 TABLET ORAL DAILY PRN
COMMUNITY

## 2025-01-13 RX ORDER — INSULIN ASPART 100 [IU]/ML
INJECTION, SUSPENSION SUBCUTANEOUS
COMMUNITY

## 2025-01-13 RX ORDER — GABAPENTIN 300 MG/1
CAPSULE ORAL
COMMUNITY
Start: 2024-08-20

## 2025-01-13 RX ORDER — LISINOPRIL 20 MG/1
1 TABLET ORAL DAILY
COMMUNITY
Start: 2021-06-04

## 2025-01-13 RX ORDER — PANTOPRAZOLE SODIUM 40 MG/1
40 TABLET, DELAYED RELEASE ORAL
COMMUNITY
Start: 2021-06-05

## 2025-01-13 RX ORDER — TAMSULOSIN HYDROCHLORIDE 0.4 MG/1
1 CAPSULE ORAL NIGHTLY
COMMUNITY

## 2025-01-13 RX ORDER — ROSUVASTATIN CALCIUM 40 MG/1
1 TABLET, COATED ORAL DAILY
COMMUNITY

## 2025-01-13 NOTE — PROGRESS NOTES
"Subjective:    CC: Pain of the Left Hand (L hand pain. LHD, Pt states he broke index finger 30 years ago, never had sx on it. Got to the point where he couldn't close it one day at work. Went to  ER and had injection in hip - helped for about 2-3 days. Able to use it but is very limited. No numbness or tingling. Has stinging pain in index and middle knuckle. Able to make a fist and grab onto things. Has swelling at times. No other concerns with it.)       HPI:  Patient comes in today for his 1st visit.  Patient complains of left hand, 2nd finger pain.  Patient states he has had a previous fracture over 30 years ago.  More recently it was over used at work, had increasing pain and swelling, went to the ER, had some anti-inflammatories, he states it is back to its normal.  He denies any swelling or specific discomfort.    ROS: Refer to HPI for pertinent ROS. All other 12 point systems negative.    Objective:  Vitals:    01/13/25 0902   Weight: 81.6 kg (180 lb)   Height: 5' 5" (1.651 m)        Physical Exam:  Patient is well-nourished developed male he is awake alert and oriented x3 skin apparent stress is pleasant and cooperative.  Examination left hand compartment soft and warm.  Skin is intact.  There is no signs symptoms of DVT or infection.  He has some mild soreness of the MCP joint of the left 2nd finger, does have a fixed flexion contracture of the PIP joint a proximally 20°.  Otherwise he has appropriate flexion and extension at the D IP joint he is neurovascular intact distally.    Images:  Outside x-rays were reviewed. Images Reviewed and discussed with patient.    Assessment:  1. Tendinitis, calcific  - Ambulatory referral/consult to Orthopedics    2. Contracture of left index finger        Plan:  At this time we discussed his physical exam and previous x-ray findings.  We have discussed his calcific tendonitis, he is currently asymptomatic we have discussed some hand exercises.  Patient understands to " call or return sooner if there is any new problems or difficulties or his symptoms do return.    Follow UP: No follow-ups on file.